# Patient Record
Sex: FEMALE | Race: WHITE | NOT HISPANIC OR LATINO | Employment: STUDENT | URBAN - METROPOLITAN AREA
[De-identification: names, ages, dates, MRNs, and addresses within clinical notes are randomized per-mention and may not be internally consistent; named-entity substitution may affect disease eponyms.]

---

## 2023-07-25 ENCOUNTER — OFFICE VISIT (OUTPATIENT)
Dept: PEDIATRICS | Facility: CLINIC | Age: 18
End: 2023-07-25
Payer: COMMERCIAL

## 2023-07-25 VITALS — BODY MASS INDEX: 27.91 KG/M2 | WEIGHT: 165.13 LBS

## 2023-07-25 DIAGNOSIS — R30.0 DYSURIA: Primary | ICD-10-CM

## 2023-07-25 PROBLEM — B07.0 PLANTAR WART, LEFT FOOT: Status: RESOLVED | Noted: 2023-07-25 | Resolved: 2023-07-25

## 2023-07-25 PROBLEM — D22.9 PIGMENTED NEVUS: Status: RESOLVED | Noted: 2023-07-25 | Resolved: 2023-07-25

## 2023-07-25 PROBLEM — R63.5 ABNORMAL WEIGHT GAIN: Status: RESOLVED | Noted: 2023-07-25 | Resolved: 2023-07-25

## 2023-07-25 PROBLEM — L70.9 ACNE: Status: RESOLVED | Noted: 2023-07-25 | Resolved: 2023-07-25

## 2023-07-25 PROBLEM — R51.9 HEADACHE: Status: RESOLVED | Noted: 2023-07-25 | Resolved: 2023-07-25

## 2023-07-25 PROBLEM — L74.510 HYPERHIDROSIS OF AXILLA: Status: RESOLVED | Noted: 2023-07-25 | Resolved: 2023-07-25

## 2023-07-25 PROBLEM — R55 VASOVAGAL SYMPTOM: Status: RESOLVED | Noted: 2023-07-25 | Resolved: 2023-07-25

## 2023-07-25 PROBLEM — N92.0 HEAVY PERIODS: Status: RESOLVED | Noted: 2023-07-25 | Resolved: 2023-07-25

## 2023-07-25 LAB
POC APPEARANCE, URINE: CLEAR
POC BILIRUBIN, URINE: NEGATIVE
POC BLOOD, URINE: NEGATIVE
POC COLOR, URINE: YELLOW
POC GLUCOSE, URINE: NEGATIVE MG/DL
POC KETONES, URINE: NEGATIVE MG/DL
POC LEUKOCYTES, URINE: NEGATIVE
POC NITRITE,URINE: NEGATIVE
POC PH, URINE: 5.5 PH
POC PROTEIN, URINE: NEGATIVE MG/DL
POC SPECIFIC GRAVITY, URINE: 1.02
POC UROBILINOGEN, URINE: 0.2 EU/DL

## 2023-07-25 PROCEDURE — 99213 OFFICE O/P EST LOW 20 MIN: CPT | Performed by: NURSE PRACTITIONER

## 2023-07-25 PROCEDURE — 87086 URINE CULTURE/COLONY COUNT: CPT

## 2023-07-25 PROCEDURE — 81003 URINALYSIS AUTO W/O SCOPE: CPT | Performed by: NURSE PRACTITIONER

## 2023-07-25 RX ORDER — LEVONORGESTREL AND ETHINYL ESTRADIOL 0.15-0.03
1 KIT ORAL DAILY
COMMUNITY
End: 2023-07-27

## 2023-07-25 RX ORDER — NITROFURANTOIN 25; 75 MG/1; MG/1
100 CAPSULE ORAL 2 TIMES DAILY
Qty: 14 CAPSULE | Refills: 0 | Status: SHIPPED | OUTPATIENT
Start: 2023-07-25 | End: 2023-08-01

## 2023-07-25 ASSESSMENT — ENCOUNTER SYMPTOMS
NAUSEA: 0
FLANK PAIN: 0
HEMATURIA: 0
FREQUENCY: 0
VOMITING: 0

## 2023-07-25 NOTE — PROGRESS NOTES
Subjective   Hiral Hernandez is a 17 y.o. female who presents for UTI (Patient is here for uti some pain when using bathroom.).      UTI   This is a new problem. The current episode started yesterday. The problem has been gradually improving. The quality of the pain is described as burning (mild). There has been no fever. She is Not sexually active. There is No history of pyelonephritis. Associated symptoms include hesitancy (a little). Pertinent negatives include no discharge, flank pain, frequency, hematuria, nausea, urgency or vomiting. She has tried nothing for the symptoms.    Leaving for [place] and [place] Thursday     Review of Systems   Gastrointestinal:  Negative for nausea and vomiting.   Genitourinary:  Positive for hesitancy (a little). Negative for flank pain, frequency, hematuria and urgency.     A ROS was completed and all systems are negative with the exception of what is noted in HPI.     Objective   Wt 74.9 kg (165 lb 2 oz)   BMI 27.91 kg/m²   Growth percentiles: No height on file for this encounter. 90 %ile (Z= 1.26) based on CDC (Girls, 2-20 Years) weight-for-age data using vitals from 7/25/2023.     Physical Exam  Constitutional:       Appearance: Normal appearance.   Cardiovascular:      Rate and Rhythm: Normal rate and regular rhythm.   Pulmonary:      Effort: Pulmonary effort is normal.      Breath sounds: Normal breath sounds.   Abdominal:      Tenderness: There is no right CVA tenderness or left CVA tenderness.   Neurological:      Mental Status: She is alert.         Assessment/Plan   Problem List Items Addressed This Visit    None  Visit Diagnoses       Dysuria    -  Primary    Relevant Medications    nitrofurantoin, macrocrystal-monohydrate, (Macrobid) 100 mg capsule    Other Relevant Orders    POCT UA Automated manually resulted (Completed)    Urine Culture              With normal UA, mild dysuria now improving- low suspicion for UTI.   Advised since she is leaving for vacation-  will send antibiotic, but would advise waiting to start. Start it if urine culture is positive or urinary symptoms worsen. Patient is comfortable with plan       NORMA Lewis-CNP

## 2023-07-26 DIAGNOSIS — Z30.41 ENCOUNTER FOR BIRTH CONTROL PILLS MAINTENANCE: Primary | ICD-10-CM

## 2023-07-26 LAB — URINE CULTURE: NORMAL

## 2023-07-27 RX ORDER — LEVONORGESTREL AND ETHINYL ESTRADIOL 0.15-0.03
1 KIT ORAL DAILY
Qty: 84 TABLET | Refills: 0 | Status: SHIPPED | OUTPATIENT
Start: 2023-07-27 | End: 2023-10-19

## 2023-08-18 ENCOUNTER — OFFICE VISIT (OUTPATIENT)
Dept: FAMILY MEDICINE CLINIC | Age: 18
End: 2023-08-18
Payer: COMMERCIAL

## 2023-08-18 VITALS
DIASTOLIC BLOOD PRESSURE: 64 MMHG | RESPIRATION RATE: 16 BRPM | WEIGHT: 164 LBS | SYSTOLIC BLOOD PRESSURE: 122 MMHG | TEMPERATURE: 98.2 F | BODY MASS INDEX: 27.32 KG/M2 | OXYGEN SATURATION: 99 % | HEIGHT: 65 IN | HEART RATE: 88 BPM

## 2023-08-18 DIAGNOSIS — J02.9 SORE THROAT: Primary | ICD-10-CM

## 2023-08-18 DIAGNOSIS — J02.9 SORE THROAT: ICD-10-CM

## 2023-08-18 LAB — S PYO AG THROAT QL: NORMAL

## 2023-08-18 PROCEDURE — 87880 STREP A ASSAY W/OPTIC: CPT | Performed by: PHYSICIAN ASSISTANT

## 2023-08-18 PROCEDURE — 99213 OFFICE O/P EST LOW 20 MIN: CPT | Performed by: PHYSICIAN ASSISTANT

## 2023-08-18 RX ORDER — LEVONORGESTREL AND ETHINYL ESTRADIOL 0.15-0.03
1 KIT ORAL DAILY
COMMUNITY
Start: 2023-07-27

## 2023-08-18 RX ORDER — AMOXICILLIN 500 MG/1
1000 CAPSULE ORAL DAILY
Qty: 20 CAPSULE | Refills: 0 | Status: SHIPPED | OUTPATIENT
Start: 2023-08-18 | End: 2023-08-28

## 2023-08-18 SDOH — ECONOMIC STABILITY: FOOD INSECURITY: WITHIN THE PAST 12 MONTHS, YOU WORRIED THAT YOUR FOOD WOULD RUN OUT BEFORE YOU GOT MONEY TO BUY MORE.: NEVER TRUE

## 2023-08-18 SDOH — ECONOMIC STABILITY: FOOD INSECURITY: WITHIN THE PAST 12 MONTHS, THE FOOD YOU BOUGHT JUST DIDN'T LAST AND YOU DIDN'T HAVE MONEY TO GET MORE.: NEVER TRUE

## 2023-08-18 SDOH — ECONOMIC STABILITY: INCOME INSECURITY: HOW HARD IS IT FOR YOU TO PAY FOR THE VERY BASICS LIKE FOOD, HOUSING, MEDICAL CARE, AND HEATING?: NOT HARD AT ALL

## 2023-08-18 SDOH — ECONOMIC STABILITY: HOUSING INSECURITY
IN THE LAST 12 MONTHS, WAS THERE A TIME WHEN YOU DID NOT HAVE A STEADY PLACE TO SLEEP OR SLEPT IN A SHELTER (INCLUDING NOW)?: NO

## 2023-08-18 ASSESSMENT — PATIENT HEALTH QUESTIONNAIRE - PHQ9
SUM OF ALL RESPONSES TO PHQ QUESTIONS 1-9: 0
2. FEELING DOWN, DEPRESSED OR HOPELESS: 0
1. LITTLE INTEREST OR PLEASURE IN DOING THINGS: 0
SUM OF ALL RESPONSES TO PHQ9 QUESTIONS 1 & 2: 0
SUM OF ALL RESPONSES TO PHQ QUESTIONS 1-9: 0

## 2023-08-18 ASSESSMENT — ENCOUNTER SYMPTOMS
EYES NEGATIVE: 1
RESPIRATORY NEGATIVE: 1
SORE THROAT: 1
GASTROINTESTINAL NEGATIVE: 1

## 2023-08-18 NOTE — PROGRESS NOTES
[id number] [id number]  [address] [facility] [address]  Dept: [address] [place] [address], South: [phone]     Pt Name: Saurabh Salazar  MRN: [id number]  [facility] 2005      HISTORY OF PRESENT ILLNESS    Devi Brown is a 19 y.o. female who presents to the [facility] with sore throat that began 4 days ago. She complains of cough and congestion that started Wednesday. She denies fever, but admits to hot and cold chills. She admits her sister was ill for similar symptoms. She did not do a home covid test.  She denies NVD and has no other concerns at this time. REVIEW OF SYSTEMS       Review of Systems   Constitutional: Negative. HENT:  Positive for congestion and sore throat. Eyes: Negative. Respiratory: Negative. Cardiovascular: Negative. Gastrointestinal: Negative. Endocrine: Negative. Genitourinary: Negative. Musculoskeletal: Negative. Skin: Negative. Neurological: Negative. Psychiatric/Behavioral: Negative. PAST MEDICAL HISTORY   History reviewed. No pertinent past medical history. SURGICAL HISTORY       Past Surgical History:   Procedure Laterality Date    WISDOM TOOTH EXTRACTION      all 4         CURRENT MEDICATIONS       Current Outpatient Medications   Medication Sig Dispense Refill    KURVELO 0.15-30 MG-MCG per tablet Take 1 tablet by mouth daily      amoxicillin (AMOXIL) 500 MG capsule Take 2 capsules by mouth daily for 10 days 20 capsule 0     No current facility-administered medications for this visit. ALLERGIES     Patient has no known allergies. FAMILY HISTORY     History reviewed. No pertinent family history.        SOCIAL HISTORY       Social History     Socioeconomic History    Marital status: Single     Spouse name: None    Number of children: None    Years of education: None    Highest education level: None   Tobacco Use    Smoking status: Never    Smokeless tobacco: Never   Vaping Use

## 2023-08-21 LAB — BACTERIA THROAT AEROBE CULT: NORMAL

## 2023-12-15 ENCOUNTER — TELEPHONE (OUTPATIENT)
Dept: PEDIATRICS | Facility: CLINIC | Age: 18
End: 2023-12-15
Payer: COMMERCIAL

## 2023-12-15 DIAGNOSIS — Z30.41 ENCOUNTER FOR BIRTH CONTROL PILLS MAINTENANCE: ICD-10-CM

## 2023-12-15 RX ORDER — LEVONORGESTREL AND ETHINYL ESTRADIOL 0.15-0.03
1 KIT ORAL DAILY
Qty: 84 TABLET | Refills: 0 | Status: SHIPPED | OUTPATIENT
Start: 2023-12-15 | End: 2024-01-11

## 2023-12-15 NOTE — TELEPHONE ENCOUNTER
Mom called asking for a refill on her birth control early due to leaving out of town. Pharmacy is [facility] in [place].

## 2024-01-11 DIAGNOSIS — Z30.41 ENCOUNTER FOR BIRTH CONTROL PILLS MAINTENANCE: ICD-10-CM

## 2024-01-11 RX ORDER — LEVONORGESTREL AND ETHINYL ESTRADIOL 0.15-0.03
1 KIT ORAL DAILY
Qty: 84 TABLET | Refills: 0 | Status: SHIPPED | OUTPATIENT
Start: 2024-01-11 | End: 2024-05-31

## 2024-03-18 ENCOUNTER — TELEPHONE (OUTPATIENT)
Dept: PEDIATRICS | Facility: CLINIC | Age: 19
End: 2024-03-18
Payer: COMMERCIAL

## 2024-03-18 NOTE — TELEPHONE ENCOUNTER
Mother called this afternoon, stating Hiral had been in [place] and was diagnosed with Influenza B. Mom stated she is still coughing , no fever and is eating and drinking. Mom asking how long will the cough last. They had put her on medication, mom was not sure if it was Tamiflu ?  I told mom as long as Shweta continued to improve, the cough can last a while. She needed to watch for any signs of fever or symptoms getting worse. If so she will need to be seen again. Mom will watch her.

## 2024-05-29 DIAGNOSIS — Z30.41 ENCOUNTER FOR BIRTH CONTROL PILLS MAINTENANCE: ICD-10-CM

## 2024-05-31 RX ORDER — LEVONORGESTREL AND ETHINYL ESTRADIOL 0.15-0.03
1 KIT ORAL DAILY
Qty: 84 TABLET | Refills: 0 | Status: SHIPPED | OUTPATIENT
Start: 2024-05-31

## 2024-05-31 NOTE — TELEPHONE ENCOUNTER
As per Dr Campos , please call patient and let her know she is over due for a well visit. Dr Campos did refill her birth control . I spoke with mother and let her know. She stated Hiral will call for a well visit .

## 2024-06-24 ENCOUNTER — APPOINTMENT (OUTPATIENT)
Dept: PEDIATRICS | Facility: CLINIC | Age: 19
End: 2024-06-24
Payer: COMMERCIAL

## 2024-06-24 VITALS
WEIGHT: 154 LBS | SYSTOLIC BLOOD PRESSURE: 126 MMHG | BODY MASS INDEX: 26.03 KG/M2 | HEART RATE: 85 BPM | DIASTOLIC BLOOD PRESSURE: 76 MMHG | RESPIRATION RATE: 24 BRPM

## 2024-06-24 DIAGNOSIS — Z30.41 ENCOUNTER FOR BIRTH CONTROL PILLS MAINTENANCE: ICD-10-CM

## 2024-06-24 DIAGNOSIS — D22.9 ATYPICAL MOLE: ICD-10-CM

## 2024-06-24 DIAGNOSIS — B34.9 VIRAL ILLNESS: Primary | ICD-10-CM

## 2024-06-24 PROCEDURE — 99213 OFFICE O/P EST LOW 20 MIN: CPT | Performed by: NURSE PRACTITIONER

## 2024-06-24 RX ORDER — PREDNISONE 20 MG/1
40 TABLET ORAL
COMMUNITY
Start: 2024-06-22 | End: 2024-06-27

## 2024-06-24 RX ORDER — LEVONORGESTREL AND ETHINYL ESTRADIOL 0.15-0.03
1 KIT ORAL DAILY
Qty: 84 TABLET | Refills: 4 | Status: SHIPPED | OUTPATIENT
Start: 2024-06-24

## 2024-06-24 RX ORDER — CEFDINIR 300 MG/1
300 CAPSULE ORAL 2 TIMES DAILY
COMMUNITY
Start: 2024-06-22 | End: 2024-07-02

## 2024-06-24 ASSESSMENT — ENCOUNTER SYMPTOMS
VOMITING: 0
SORE THROAT: 1
SHORTNESS OF BREATH: 0
SWOLLEN GLANDS: 1
COUGH: 0
HEADACHES: 1
DIARRHEA: 0

## 2024-06-24 NOTE — PROGRESS NOTES
Subjective   Hiral Hernandez is a 18 y.o. female who presents for Follow-up (Followup birthcontrol./Has white spots on tonsils, swollen lymph nodes, fatigue, and ear pain that started last Friday. /Went to urgent care and has antibiotic and steroid. ).    Recently returned from [place]  When on airplane felt dizzy and nauseated     OCP-   Been taking since 2022   Takes daily   No side effects   Not sexually active   Lighter periods     Went to urgent care Saturday for illness   Strep neg   Given cefdinir, prednisone for tonsillitis   Advised to have monospot done today     Sore Throat   This is a new problem. Episode onset: 3 days. The problem has been unchanged. There has been no fever. Associated symptoms include ear pain (constantly), headaches (minor), a plugged ear sensation and swollen glands. Pertinent negatives include no congestion, coughing, diarrhea, shortness of breath or vomiting. She has tried acetaminophen (cefdinir, steroids) for the symptoms.        Review of Systems   HENT:  Positive for ear pain (constantly) and sore throat. Negative for congestion.    Respiratory:  Negative for cough and shortness of breath.    Gastrointestinal:  Negative for diarrhea and vomiting.   Neurological:  Positive for headaches (minor).     A ROS was completed and all systems are negative with the exception of what is noted in HPI.     Objective   /76   Pulse 85   Resp 24   Wt 69.9 kg (154 lb)   BMI 26.03 kg/m²   Growth percentiles: Facility age limit for growth %valentín is 20 years. Facility age limit for growth %valentín is 20 years.     Physical Exam  Constitutional:       General: She is not in acute distress.     Appearance: She is not ill-appearing or toxic-appearing.   HENT:      Right Ear: Tympanic membrane, ear canal and external ear normal.      Left Ear: Tympanic membrane, ear canal and external ear normal.      Mouth/Throat:      Mouth: Mucous membranes are moist.      Pharynx: Oropharynx is clear.   Eyes:       Conjunctiva/sclera: Conjunctivae normal.   Cardiovascular:      Rate and Rhythm: Normal rate and regular rhythm.      Heart sounds: Normal heart sounds.   Pulmonary:      Effort: Pulmonary effort is normal.      Breath sounds: Normal breath sounds.   Abdominal:      General: Abdomen is flat. Bowel sounds are normal.      Palpations: There is no hepatomegaly or splenomegaly.   Musculoskeletal:      Cervical back: Normal range of motion.   Lymphadenopathy:      Cervical: Cervical adenopathy present.   Skin:     Findings: No rash.             Comments: Raised nevus, not consistent color throughout    Neurological:      Mental Status: She is alert.         Assessment/Plan   Problem List Items Addressed This Visit       Atypical mole     Lower right back- seen by derm in past.   Had biopsy that was normal per patient   Advised changes today appear to be keloid. However there is some irregularity in color, should follow up with derm           Other Visit Diagnoses       Viral illness    -  Primary    Encounter for birth control pills maintenance        Relevant Medications    levonorgestreL-ethinyl estrad (Kurvelo, 28,) 0.15-0.03 mg tablet          Likely viral illness today. Suspicion for mono   Go for testing ordered by [facility]. If pos than this illness is likely Jese Barr infection   If neg and still with symptoms into next week, call and I will order full Jese Santillan panel.   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

## 2024-06-24 NOTE — ASSESSMENT & PLAN NOTE
Lower right back- seen by derm in past.   Had biopsy that was normal per patient   Advised changes today appear to be keloid. However there is some irregularity in color, should follow up with derm

## 2025-05-06 ENCOUNTER — APPOINTMENT (OUTPATIENT)
Dept: OBSTETRICS AND GYNECOLOGY | Facility: CLINIC | Age: 20
End: 2025-05-06
Payer: COMMERCIAL

## 2025-07-23 DIAGNOSIS — Z30.41 ENCOUNTER FOR BIRTH CONTROL PILLS MAINTENANCE: ICD-10-CM

## 2025-07-23 RX ORDER — LEVONORGESTREL AND ETHINYL ESTRADIOL 0.15-0.03
1 KIT ORAL DAILY
Qty: 84 TABLET | Refills: 0 | OUTPATIENT
Start: 2025-07-23